# Patient Record
Sex: MALE | Race: WHITE
[De-identification: names, ages, dates, MRNs, and addresses within clinical notes are randomized per-mention and may not be internally consistent; named-entity substitution may affect disease eponyms.]

---

## 2019-04-18 ENCOUNTER — HOSPITAL ENCOUNTER (EMERGENCY)
Dept: HOSPITAL 74 - FER | Age: 19
Discharge: HOME | End: 2019-04-18
Payer: COMMERCIAL

## 2019-04-18 VITALS — TEMPERATURE: 98.3 F | DIASTOLIC BLOOD PRESSURE: 99 MMHG | SYSTOLIC BLOOD PRESSURE: 146 MMHG | HEART RATE: 75 BPM

## 2019-04-18 VITALS — BODY MASS INDEX: 29.5 KG/M2
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
## 2019-04-18 DIAGNOSIS — S62.022A: Primary | ICD-10-CM

## 2019-04-18 NOTE — PDOC
History of Present Illness





- General


Chief Complaint: Injury


Stated Complaint: LEFT WRIST INJURY


Time Seen by Provider: 04/18/19 12:22


History Source: Patient


Exam Limitations: No Limitations





- History of Present Illness


Initial Comments: 





04/18/19 12:49


19y M no pmhx presents with L wrist pain sp LIDA lopez playing basketball. Pt 

localizes pain tot he L wrist particularly when he rotates his wrist. No 

numbenss/tingling/weakness. No elbow, hand or shoulder pain. No other injuries





ROS:


MSK: L wrist pain w/o numbness/tingling/weakness





Physical exam: Normal ROM of L shoulder/elbow and hand at each digit. Mild pain 

with passive/active ROM of L wrist, pain with ulnar/radial deviation as well as 

flexion/extension of wrist. mild ttp at L snuff box and pain with axial loading 

of L thumb, no significant swelling, no ecchymosis  stepoffs or crepitus 

appreciated





will obtan wrist xray to r/o fx


possible scapoid fx - will likely splint if xray is neg











Past History





- Past Medical History


Allergies/Adverse Reactions: 


 Allergies











Allergy/AdvReac Type Severity Reaction Status Date / Time


 


azithromycin Allergy  Rash Verified 04/18/19 12:18





[From Zithromax Z-Rainer]     


 


GRASS Allergy   Uncoded 04/18/19 12:18


 


SEASONAL Allergy   Uncoded 04/18/19 12:18











Home Medications: 


Ambulatory Orders





NK [No Known Home Medication]  04/18/19 








Asthma: Yes


COPD: No


Diabetes: No


Seizures: No





- Suicide/Smoking/Psychosocial Hx


Smoking Status: No


Smoking History: Never smoked


Have you smoked in the past 12 months: No


Number of Cigarettes Smoked Daily: 0


Information on smoking cessation initiated: No


Hx Alcohol Use: No


Drug/Substance Use Hx: No


Substance Use Type: None


Hx Substance Use Treatment: No





*Physical Exam





- Vital Signs


 Last Vital Signs











Temp Pulse Resp BP Pulse Ox


 


 98.3 F   75   20   146/99   98 


 


 04/18/19 12:18  04/18/19 12:18  04/18/19 12:18  04/18/19 12:18  04/18/19 12:18














Procedures





- Consent


Consent obtained: Verbal





- Splinting


Splint Location: Left: Wrist


Pre-Proc Neuro Vasc Exam: normal


Hand-Made Type: orthoglass


Splint Type: Yes: Thumb Spica, Wrist


Post-Proc Neuro Vasc Exam: normal


Ace Bandage: 4"


Sling: Yes


Complications: No


Post splint xray: No


Good repositioning: No





ED Treatment Course





- RADIOLOGY


Radiology Studies Ordered: 














 Category Date Time Status


 


 WRIST-LEFT [RAD] Stat Radiology  04/18/19 12:42 Ordered














Medical Decision Making





- Medical Decision Making





04/18/19 13:20


pts xray noted for scaphoid fracture


will place in thumb spica and have pt fu with dr. monson








04/18/19 13:59


Pts mom requests Dr. Dominguez,


case che Almeida - agree w/ thumb spica and fu with kandy on tuesday





*DC/Admit/Observation/Transfer


Diagnosis at time of Disposition: 


Scaphoid fracture


Qualifiers:


 Encounter type: initial encounter Scaphoid bone location: middle third 

Fracture type: closed Fracture alignment: displaced Laterality: left Qualified 

Code(s): S62.022A - Displaced fracture of middle third of navicular [scaphoid] 

bone of left wrist, initial encounter for closed fracture








- Discharge Dispostion


Disposition: HOME


Condition at time of disposition: Stable


Decision to Admit order: No





- Referrals


Referrals: 


Jordan Dominguez MD [Staff Physician] - 





- Patient Instructions


Printed Discharge Instructions:  Wrist Fracture, How to Take Care of Your Splint


Additional Instructions: 


Return to the emergency department immediately with ANY new, persistent or 

worsening symptoms.





Keep the splint on. Do not let it get wet.


Use the sling for comfort.


Take tylenol or motrin as needed for comfort.


Follow up with Dr. Dominguez on Tuesday for further evaluation.





Print Language: ENGLISH





- Post Discharge Activity

## 2020-10-21 ENCOUNTER — HOSPITAL ENCOUNTER (EMERGENCY)
Dept: HOSPITAL 74 - FER | Age: 20
Discharge: HOME | End: 2020-10-21
Payer: COMMERCIAL

## 2020-10-21 VITALS — BODY MASS INDEX: 28.7 KG/M2

## 2020-10-21 VITALS — SYSTOLIC BLOOD PRESSURE: 138 MMHG | TEMPERATURE: 99.1 F | HEART RATE: 55 BPM | DIASTOLIC BLOOD PRESSURE: 83 MMHG

## 2020-10-21 DIAGNOSIS — B34.9: Primary | ICD-10-CM

## 2020-10-21 LAB
ALBUMIN SERPL-MCNC: 4.8 G/DL (ref 3.4–5)
ALP SERPL-CCNC: 67 U/L (ref 45–117)
ALT SERPL-CCNC: 68 U/L (ref 13–61)
ANION GAP SERPL CALC-SCNC: 7 MMOL/L (ref 8–16)
AST SERPL-CCNC: 29 U/L (ref 15–37)
BASOPHILS # BLD: 0.9 % (ref 0–2)
BILIRUB SERPL-MCNC: 0.9 MG/DL (ref 0.2–1)
BUN SERPL-MCNC: 8 MG/DL (ref 7–18)
CALCIUM SERPL-MCNC: 9.4 MG/DL (ref 8.5–10)
CHLORIDE SERPL-SCNC: 104 MMOL/L (ref 98–107)
CO2 SERPL-SCNC: 27 MMOL/L (ref 21–32)
CREAT SERPL-MCNC: 1 MG/DL (ref 0.55–1.3)
DEPRECATED RDW RBC AUTO: 12.6 % (ref 11.9–15.9)
EOSINOPHIL # BLD: 3.5 % (ref 0–4.5)
GLUCOSE SERPL-MCNC: 96 MG/DL (ref 74–106)
HCT VFR BLD CALC: 47.7 % (ref 35.4–49)
HGB BLD-MCNC: 16.2 GM/DL (ref 11.7–16.9)
LYMPHOCYTES # BLD: 30.6 % (ref 8–40)
MCH RBC QN AUTO: 29.4 PG (ref 25.7–33.7)
MCHC RBC AUTO-ENTMCNC: 34 G/DL (ref 32–35.9)
MCV RBC: 86.5 FL (ref 80–96)
MONOCYTES # BLD AUTO: 10 % (ref 3.8–10.2)
NEUTROPHILS # BLD: 55 % (ref 42.8–82.8)
PLATELET # BLD AUTO: 261 K/MM3 (ref 134–434)
PMV BLD: 8.9 FL (ref 7.5–11.1)
POTASSIUM SERPLBLD-SCNC: 4 MMOL/L (ref 3.5–5.1)
PROT SERPL-MCNC: 7.4 G/DL (ref 6.4–8.2)
RBC # BLD AUTO: 5.52 M/MM3 (ref 4–5.6)
SODIUM SERPL-SCNC: 138 MMOL/L (ref 136–145)
WBC # BLD AUTO: 5.2 K/MM3 (ref 4–10.8)

## 2020-10-21 PROCEDURE — U0003 INFECTIOUS AGENT DETECTION BY NUCLEIC ACID (DNA OR RNA); SEVERE ACUTE RESPIRATORY SYNDROME CORONAVIRUS 2 (SARS-COV-2) (CORONAVIRUS DISEASE [COVID-19]), AMPLIFIED PROBE TECHNIQUE, MAKING USE OF HIGH THROUGHPUT TECHNOLOGIES AS DESCRIBED BY CMS-2020-01-R: HCPCS

## 2020-10-21 PROCEDURE — C9803 HOPD COVID-19 SPEC COLLECT: HCPCS

## 2020-10-21 NOTE — PDOC
History of Present Illness





- General


Chief Complaint: Nausea


Stated Complaint: NAUSEA DECREASED APPETITE 2 DAYS


Time Seen by Provider: 10/21/20 10:10





- History of Present Illness


Initial Comments: 





10/21/20 10:23


Chief complaint: Intermittent nausea and loss of appetite





HPI: Approximately 2 weeks ago had a sore throat, pus on the left tonsil, and an

enlarged left neck node.  This is all resolved, but intermittent nausea and loss

of appetite have persisted.





Review of systems: No fever/chills, URI symptoms, sore throat, cough, chest 

pain, shortness of breath, abdominal pain, vomiting or diarrhea.  No at risk 

sexual behavior for hepatitis or HIV.  No tobacco drugs or alcohol.  Remainder 

of systems reviewed and noncontributory





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  

Otherwise negative





Social history: As noted above, no drugs tobacco or alcohol.  Recently graduated

from college, living at home with family, searching for employment.





Family history: Reviewed and noncontributory





Physical exam: Alert and oriented well-developed well-nourished no acute 

distress cooperative


Afebrile, vital signs normal


No pallor or icterus.  PERRLA, conjunctivae clear, ENT clear


Neck supple without bruit mass or nodes


Chest clear


CV regular without murmur rub or gallop


Abdomen nondistended.  Bowel sounds normal.  Soft without mass tenderness 

organomegaly


Skin clear, no rash, adequate turgor and wet mucous membranes


Neurological intact





Impression: Persistent nausea and anorexia for 2 weeks subsequent to exudative 

pharyngitis and left cervical adenopathy.  Rule out mono, rule out chronic 

streptococcal pharyngitis, rule out hepatitis.  Other possibilities 

anxiety/depression, although denied by patient.





Plan: CBC, chemistries, including LFTs, mono, UA, strep screen, and Covid.  

Zofran for symptomatic treatment.  Further evaluation depending on results.





Past History





- Medical History


Allergies/Adverse Reactions: 


                                    Allergies











Allergy/AdvReac Type Severity Reaction Status Date / Time


 


azithromycin Allergy  Rash Verified 10/21/20 09:53





[From Zithromax Z-Rainer]     


 


GRASS Allergy   Uncoded 10/21/20 09:53


 


SEASONAL Allergy   Uncoded 10/21/20 09:53











Home Medications: 


Ambulatory Orders





NK [No Known Home Medication]  10/21/20 








Asthma: Yes


COPD: No


Diabetes: No


Seizures: No





- Psycho-Social/Smoking History


Smoking Status: No


Smoking History: Never smoked


Have you smoked in the past 12 months: No


Number of Cigarettes Smoked Daily: 0





- Substance Abuse Hx (Audit-C & DAST Scrn)


How often the patient has a drink containing alcohol: 2-4 times / month


Number of drinks the patient has on a typical day: 1 or 2


Score: In Men: 4 or > Positive; In Women: 3 or > Positive: 2


Screen Result (Pos requires Nsg. Audit-10AR): Negative


In the last yr the pt used illegal drug/Rx for NonMed reason: Yes


Score:  Yes response is considered Positive: 1


Screen Result (Positive result requires Nsg. DAST-10): Positive





*Physical Exam





- Vital Signs


                                Last Vital Signs











Temp Pulse Resp BP Pulse Ox


 


 99.1 F   55 L  16   138/83   100 


 


 10/21/20 09:52  10/21/20 09:52  10/21/20 09:52  10/21/20 09:52  10/21/20 09:52














ED Treatment Course





- LABORATORY


CBC & Chemistry Diagram: 


                                 10/21/20 10:30





                                 10/21/20 10:30





Medical Decision Making





- Medical Decision Making





10/21/20 11:31


CBC, chemistries including LFTs, urinalysis show no significant abnormalities.  

Strep is negative.





COVID-19 and mono pending





No nausea at present.





Patient symptoms could be due to to a prolonged post viral syndrome, or even 

anxiety/stress due to the times.  This was discussed with the patient and his 

mother.  He will emphasize good nutrition, exercise, relaxation techniques, and 

follow-up with primary physician.  Fully ambulatory and in no pain or other 

distress at discharge with mother to follow-up as needed.





Discharge





- Discharge Information


Problems reviewed: Yes


Clinical Impression/Diagnosis: 


 Viral syndrome





Condition: Stable


Disposition: HOME





- Admission


No





- Follow up/Referral





- Patient Discharge Instructions


Patient Printed Discharge Instructions:  DI for Nausea -- Adult


Additional Instructions: 


No significant abnormalities were found in CBC, chemistries, or urinalysis.





COVID-19 and mono tests are pending.  They are sent out and will take 1 or 2 

days for the results to return





Persistent symptoms could be due to the aftermath of a prior viral infection, or

even the stress of these times.  Follow-up with primary physician as needed.  

Return to ER if symptoms worsen or additional symptoms develop.





- Post Discharge Activity

## 2020-10-21 NOTE — XMS
Summarization Of Episode

                           Created on:2020



Patient:NOVA CHOUDHARY

Sex:Male

:2000

External Reference #:[id number]





Demographics







                          Address                   [address] [place] [address]

 

                          Home Phone                [phone]

 

                          Preferred Language        English

 

                          Marital Status            Not  or 

 

                          Muslim Affiliation     [place]

 

                          Race                      [place]

 

                          Ethnic Group              Not  or 









Author







                          Organization              [facility]









Support







                Name            Relationship    Address         Phone

 

                GOKUL             Unavailable     Unavailable     Unavailable

 

                VLAD CHOUDHARY     FATHER          [address]     [phone]



                                                [place] [address] 









Re-disclosure Warning

The records that you are about to access may contain information from federally-
assisted alcohol or drug abuse programs. If such information is present, then 
the following federally mandated warning applies: This information has been 
disclosed to you from records protected by federal confidentiality rules (42 CFR
part 2). The federal rules prohibit you from making any further disclosure of 
this information unless further disclosure is expressly permitted by the written
consent of the person to whom it pertains or as otherwise permitted by 42 CFR 
part 2. A general authorization for the release of medical or other information 
is NOT sufficient for this purpose. The Federal rules restrict any use of the 
information to criminally investigate or prosecute any alcohol or drug abuse 
patient.The records that you are about to access may contain highly sensitive 
health information, the redisclosure of which is protected by Article 27-F of 
the [facility] Public Health law. If you continue you may haveaccess to 
information: Regarding HIV / AIDS; Provided by facilities licensed or operated 
by the [facility] Office of Mental Health; or Provided by the [facility]
Office for People With Developmental Disabilities. If such information is 
present, then the following [place] mandated warning applies: This 
information has been disclosed to you from confidential records which are 
protected by state law. State law prohibits you from making any further 
disclosure of this information without the specific written consent of the 
person to whom it pertains, or as otherwise permitted by law. Any unauthorized 
further disclosure in violation of state law may result in a fine or [facility] 
sentence or both. A general authorization for the release of medical or other 
information is NOT sufficient authorization for further disclosure.



Insurance Providers







          Payer name Policy type / Policy ID Covered   Covered party's Policy   

 Plan



                    Coverage type           party ID  relationship to Luz    

Information



                                                  luz              

 

          [facility]              [id number]           [place]                  [id number]

77



                              7                                       

 

          [employer]              [id number]                             [id number]

03



                              3

## 2022-06-15 ENCOUNTER — HOSPITAL ENCOUNTER (EMERGENCY)
Dept: HOSPITAL 74 - FER | Age: 22
Discharge: HOME | End: 2022-06-15
Payer: COMMERCIAL

## 2022-06-15 VITALS — BODY MASS INDEX: 25.1 KG/M2

## 2022-06-15 VITALS — SYSTOLIC BLOOD PRESSURE: 153 MMHG | TEMPERATURE: 100.4 F | HEART RATE: 105 BPM | DIASTOLIC BLOOD PRESSURE: 103 MMHG

## 2022-06-15 DIAGNOSIS — R50.9: Primary | ICD-10-CM

## 2022-06-15 LAB — S PYO DNA THROAT QL NAA+PROBE: NOT DETECTED
